# Patient Record
Sex: MALE | Race: OTHER | URBAN - METROPOLITAN AREA
[De-identification: names, ages, dates, MRNs, and addresses within clinical notes are randomized per-mention and may not be internally consistent; named-entity substitution may affect disease eponyms.]

---

## 2023-02-16 ENCOUNTER — EMERGENCY (EMERGENCY)
Facility: HOSPITAL | Age: 38
LOS: 0 days | Discharge: HOME | End: 2023-02-16
Attending: STUDENT IN AN ORGANIZED HEALTH CARE EDUCATION/TRAINING PROGRAM
Payer: MEDICAID

## 2023-02-16 VITALS
WEIGHT: 160.06 LBS | HEIGHT: 68 IN | RESPIRATION RATE: 18 BRPM | DIASTOLIC BLOOD PRESSURE: 84 MMHG | TEMPERATURE: 98 F | HEART RATE: 85 BPM | OXYGEN SATURATION: 99 % | SYSTOLIC BLOOD PRESSURE: 126 MMHG

## 2023-02-16 VITALS
RESPIRATION RATE: 20 BRPM | HEART RATE: 64 BPM | OXYGEN SATURATION: 99 % | TEMPERATURE: 99 F | SYSTOLIC BLOOD PRESSURE: 111 MMHG | DIASTOLIC BLOOD PRESSURE: 62 MMHG

## 2023-02-16 DIAGNOSIS — R55 SYNCOPE AND COLLAPSE: ICD-10-CM

## 2023-02-16 DIAGNOSIS — R07.89 OTHER CHEST PAIN: ICD-10-CM

## 2023-02-16 DIAGNOSIS — F17.200 NICOTINE DEPENDENCE, UNSPECIFIED, UNCOMPLICATED: ICD-10-CM

## 2023-02-16 DIAGNOSIS — R11.2 NAUSEA WITH VOMITING, UNSPECIFIED: ICD-10-CM

## 2023-02-16 DIAGNOSIS — F12.90 CANNABIS USE, UNSPECIFIED, UNCOMPLICATED: ICD-10-CM

## 2023-02-16 DIAGNOSIS — R10.9 UNSPECIFIED ABDOMINAL PAIN: ICD-10-CM

## 2023-02-16 LAB
ALBUMIN SERPL ELPH-MCNC: 4.4 G/DL — SIGNIFICANT CHANGE UP (ref 3.3–5)
ALP SERPL-CCNC: 73 U/L — SIGNIFICANT CHANGE UP (ref 40–120)
ALT FLD-CCNC: 16 U/L — SIGNIFICANT CHANGE UP (ref 12–78)
ANION GAP SERPL CALC-SCNC: 13 MMOL/L — SIGNIFICANT CHANGE UP (ref 5–17)
AST SERPL-CCNC: 27 U/L — SIGNIFICANT CHANGE UP (ref 15–37)
BASOPHILS # BLD AUTO: 0.02 K/UL — SIGNIFICANT CHANGE UP (ref 0–0.2)
BASOPHILS NFR BLD AUTO: 0.2 % — SIGNIFICANT CHANGE UP (ref 0–2)
BILIRUB SERPL-MCNC: 1.7 MG/DL — HIGH (ref 0.2–1.2)
BUN SERPL-MCNC: 13 MG/DL — SIGNIFICANT CHANGE UP (ref 7–23)
CALCIUM SERPL-MCNC: 8.4 MG/DL — LOW (ref 8.5–10.1)
CHLORIDE SERPL-SCNC: 102 MMOL/L — SIGNIFICANT CHANGE UP (ref 96–108)
CO2 SERPL-SCNC: 24 MMOL/L — SIGNIFICANT CHANGE UP (ref 22–31)
CREAT SERPL-MCNC: 1.17 MG/DL — SIGNIFICANT CHANGE UP (ref 0.5–1.3)
EGFR: 83 ML/MIN/1.73M2 — SIGNIFICANT CHANGE UP
EOSINOPHIL # BLD AUTO: 0 K/UL — SIGNIFICANT CHANGE UP (ref 0–0.5)
EOSINOPHIL NFR BLD AUTO: 0 % — SIGNIFICANT CHANGE UP (ref 0–6)
GLUCOSE SERPL-MCNC: 110 MG/DL — HIGH (ref 70–99)
HCT VFR BLD CALC: 45.8 % — SIGNIFICANT CHANGE UP (ref 39–50)
HGB BLD-MCNC: 15.6 G/DL — SIGNIFICANT CHANGE UP (ref 13–17)
IMM GRANULOCYTES NFR BLD AUTO: 0.4 % — SIGNIFICANT CHANGE UP (ref 0–0.9)
LIDOCAIN IGE QN: 193 U/L — SIGNIFICANT CHANGE UP (ref 73–393)
LYMPHOCYTES # BLD AUTO: 19 % — SIGNIFICANT CHANGE UP (ref 13–44)
LYMPHOCYTES # BLD AUTO: 2.12 K/UL — SIGNIFICANT CHANGE UP (ref 1–3.3)
MCHC RBC-ENTMCNC: 28.8 PG — SIGNIFICANT CHANGE UP (ref 27–34)
MCHC RBC-ENTMCNC: 34.1 G/DL — SIGNIFICANT CHANGE UP (ref 32–36)
MCV RBC AUTO: 84.5 FL — SIGNIFICANT CHANGE UP (ref 80–100)
MONOCYTES # BLD AUTO: 0.91 K/UL — HIGH (ref 0–0.9)
MONOCYTES NFR BLD AUTO: 8.2 % — SIGNIFICANT CHANGE UP (ref 2–14)
NEUTROPHILS # BLD AUTO: 8.06 K/UL — HIGH (ref 1.8–7.4)
NEUTROPHILS NFR BLD AUTO: 72.2 % — SIGNIFICANT CHANGE UP (ref 43–77)
NRBC # BLD: 0 /100 WBCS — SIGNIFICANT CHANGE UP (ref 0–0)
PLATELET # BLD AUTO: 245 K/UL — SIGNIFICANT CHANGE UP (ref 150–400)
POTASSIUM SERPL-MCNC: 2.9 MMOL/L — CRITICAL LOW (ref 3.5–5.3)
POTASSIUM SERPL-SCNC: 2.9 MMOL/L — CRITICAL LOW (ref 3.5–5.3)
PROT SERPL-MCNC: 8.1 GM/DL — SIGNIFICANT CHANGE UP (ref 6–8.3)
RBC # BLD: 5.42 M/UL — SIGNIFICANT CHANGE UP (ref 4.2–5.8)
RBC # FLD: 12.9 % — SIGNIFICANT CHANGE UP (ref 10.3–14.5)
SODIUM SERPL-SCNC: 139 MMOL/L — SIGNIFICANT CHANGE UP (ref 135–145)
WBC # BLD: 10.95 K/UL — HIGH (ref 3.8–10.5)
WBC # FLD AUTO: 10.95 K/UL — HIGH (ref 3.8–10.5)

## 2023-02-16 PROCEDURE — 93010 ELECTROCARDIOGRAM REPORT: CPT

## 2023-02-16 PROCEDURE — 99285 EMERGENCY DEPT VISIT HI MDM: CPT

## 2023-02-16 PROCEDURE — 71045 X-RAY EXAM CHEST 1 VIEW: CPT | Mod: 26

## 2023-02-16 PROCEDURE — 76705 ECHO EXAM OF ABDOMEN: CPT | Mod: 26

## 2023-02-16 RX ORDER — POTASSIUM CHLORIDE 20 MEQ
10 PACKET (EA) ORAL
Refills: 0 | Status: COMPLETED | OUTPATIENT
Start: 2023-02-16 | End: 2023-02-16

## 2023-02-16 RX ORDER — ONDANSETRON 8 MG/1
1 TABLET, FILM COATED ORAL
Qty: 21 | Refills: 0
Start: 2023-02-16 | End: 2023-02-22

## 2023-02-16 RX ORDER — ONDANSETRON 8 MG/1
4 TABLET, FILM COATED ORAL ONCE
Refills: 0 | Status: COMPLETED | OUTPATIENT
Start: 2023-02-16 | End: 2023-02-16

## 2023-02-16 RX ORDER — DIPHENHYDRAMINE HCL 50 MG
25 CAPSULE ORAL ONCE
Refills: 0 | Status: COMPLETED | OUTPATIENT
Start: 2023-02-16 | End: 2023-02-16

## 2023-02-16 RX ORDER — FAMOTIDINE 10 MG/ML
20 INJECTION INTRAVENOUS ONCE
Refills: 0 | Status: COMPLETED | OUTPATIENT
Start: 2023-02-16 | End: 2023-02-16

## 2023-02-16 RX ORDER — SODIUM CHLORIDE 9 MG/ML
2300 INJECTION INTRAMUSCULAR; INTRAVENOUS; SUBCUTANEOUS ONCE
Refills: 0 | Status: COMPLETED | OUTPATIENT
Start: 2023-02-16 | End: 2023-02-16

## 2023-02-16 RX ORDER — METOCLOPRAMIDE HCL 10 MG
10 TABLET ORAL ONCE
Refills: 0 | Status: COMPLETED | OUTPATIENT
Start: 2023-02-16 | End: 2023-02-16

## 2023-02-16 RX ADMIN — FAMOTIDINE 20 MILLIGRAM(S): 10 INJECTION INTRAVENOUS at 16:36

## 2023-02-16 RX ADMIN — Medication 100 MILLIEQUIVALENT(S): at 16:35

## 2023-02-16 RX ADMIN — Medication 100 MILLIEQUIVALENT(S): at 20:29

## 2023-02-16 RX ADMIN — SODIUM CHLORIDE 2300 MILLILITER(S): 9 INJECTION INTRAMUSCULAR; INTRAVENOUS; SUBCUTANEOUS at 15:51

## 2023-02-16 RX ADMIN — Medication 100 MILLIEQUIVALENT(S): at 18:01

## 2023-02-16 RX ADMIN — ONDANSETRON 4 MILLIGRAM(S): 8 TABLET, FILM COATED ORAL at 15:53

## 2023-02-16 RX ADMIN — Medication 10 MILLIGRAM(S): at 16:35

## 2023-02-16 RX ADMIN — Medication 25 MILLIGRAM(S): at 18:01

## 2023-02-16 RX ADMIN — Medication 30 MILLILITER(S): at 18:12

## 2023-02-16 NOTE — ED PROVIDER NOTE - PHYSICAL EXAMINATION
GEN: Awake, alert, interactive, appears uncomfortable / with active emesis in ED.   HEAD AND NECK: NC/AT. Airway patent. Neck supple.   EYES:  Clear b/l. EOMI. PERRL.   ENT: Moist mucus membranes.   CARDIAC: Regular rate, regular rhythm. No evident pedal edema.    RESP/CHEST: Normal respiratory effort with no use of accessory muscles or retractions. Clear throughout on auscultation.  ABD: Soft, non-distended, non-tender. No rebound, no guarding.   BACK: No midline spinal TTP. No CVAT.   EXTREMITIES: Moving all extremities with no apparent deformities.   SKIN: Warm, dry, intact normal color. No rash.   NEURO: AOx3, CN II-XII grossly intact, no focal deficits.   PSYCH: Appropriate mood and affect.

## 2023-02-16 NOTE — ED ADULT NURSE NOTE - NSIMPLEMENTINTERV_GEN_ALL_ED
Implemented All Universal Safety Interventions:  [place] to call system. Call bell, personal items and telephone within reach. Instruct patient to call for assistance. Room bathroom lighting operational. Non-slip footwear when patient is off stretcher. Physically safe environment: no spills, clutter or unnecessary equipment. Stretcher in lowest position, wheels locked, appropriate side rails in place.

## 2023-02-16 NOTE — ED ADULT NURSE REASSESSMENT NOTE - NS ED NURSE REASSESS COMMENT FT1
patient walking around ER talking loud and being verbally abusive towards er staff , because " I don't know why I'm not inside and I wait here " nurse explained the patient that as soon as there will be a bed available he will go in

## 2023-02-16 NOTE — ED PROVIDER NOTE - CLINICAL SUMMARY MEDICAL DECISION MAKING FREE TEXT BOX
Otherwise healthy 37M + daily marijuana use pw 1wk N/V, diffuse abd pain. AF, VSS. Pt appears uncomfortable, w/ active emesis. Plan for meds, labs +/- CT imaging. Re-eval. Otherwise healthy 37M + daily marijuana use pw 1wk N/V, diffuse abd pain. AF, VSS. Pt appears uncomfortable, w/ active emesis. Plan for meds, labs +/- CT imaging. Re-eval.  W/u w/o significant abnormalities other than K+2.9, given IV replacement. On re-eval, pt w/ improvement in symptoms w/ ED meds. Pt care signed out at change of shift, pending US.

## 2023-02-16 NOTE — ED ADULT NURSE NOTE - OBJECTIVE STATEMENT
BIBA as per patient c/o vomiting and abdominal pain x 1 week. Seen and discharged from [facility] on 2/14. Pt reporting chest pain, "  Hx: HTN

## 2023-02-16 NOTE — ED PROVIDER NOTE - OBJECTIVE STATEMENT
37M pw 1wk of diffuse abd pain a/w N/V. Pt reports seen at OSH Friday, d/c home. Pt w/ near-syncope Monday, return to ED, d/c home. Pt w/ continued symptoms, near-syncope today. + chest burning / tightness. Denies F/C, h/a, dizziness, SOB, cough, flank pain, diarrhea / constipation, UTI sx, LE pain / swelling.     PMH none, PSH none, NKDA, no meds. + daily marijuana use. + smoker.

## 2023-02-16 NOTE — ED PROVIDER NOTE - CARE PROVIDER_API CALL
Juan Luis Don)  Medicine  [address], [address]  [place] [address]  Phone: [phone]  Fax: [phone]  Follow Up Time: 7-10 Days

## 2023-02-16 NOTE — ED PROVIDER NOTE - PATIENT PORTAL LINK FT
You can access the FollowMyHealth Patient Portal offered by [facility] by registering at the following website: [email or web address]. By joining [employer]’s FollowMyHealth portal, you will also be able to view your health information using other applications (apps) compatible with our system.

## 2023-02-16 NOTE — ED PROVIDER NOTE - NSFOLLOWUPINSTRUCTIONS_ED_ALL_ED_FT
What is cannabinoid hyperemesis syndrome?    Cannabinoid hyperemesis syndrome (CHS) is a condition that leads to repeated and severe bouts of vomiting. It is rare and only occurs in daily long-term users of marijuana (cannabis). Usually marijuana and other cannabinoids decrease nausea and vomiting, but in some high frequency long-term users, marijuana has the opposite effect.    Marijuana is the dried leaves, flowers, stems, and seeds from the Cannabis sativa plant. Hashish is made from the concentrated resins of the Cannabis satvia's female flower. Marijuana has several active substances. These include tetrahydrocannabinol (THC) and related chemicals. These bind to molecules found in the brain. That causes the drug “high” and other effects that users feel.    Your digestive tract also has a number of molecules that bind to THC and related substances. So marijuana also affects the digestive tract. For example, the drug can alter the time it takes the stomach to empty. It also affects the esophageal sphincter. That’s the tight band of muscle that opens and closes to let food from the esophagus into the stomach. Long-term marijuana use can change the way the affected molecules respond and lead to the symptoms of CHS.    Marijuana is the most widely used illegal drug in the [place] Young adults are the most frequent users. A small portion of these people develop CHS. It usually only happens in people who have regularly used marijuana for several years. Often CHS affects those who use the drug at least once a day.    What causes cannabinoid hyperemesis syndrome?    Marijuana has very complex effects on the body. Experts are still trying to learn exactly how it causes CHS in some people.    In the brain, marijuana often has the opposite effect of CHS. It helps prevent nausea and vomiting. The drug is also good at stopping such symptoms in people having chemotherapy.    In the digestive tract, marijuana seems to have the opposite effect. It actually makes you more likely to have nausea and vomiting. With first use, the signals from the brain may be more important. That may lead to anti-nausea effects at first. But with repeated use of marijuana, certain receptors in the brain may stop responding to the drug in the same way. That may cause the repeated bouts of vomiting found in people with CHS.    It still isn’t clear why some heavy marijuana users get the syndrome. But others do not.    What are the symptoms of cannabinoid hyperemesis syndrome?    People with CHS suffer from repeated bouts of vomiting. In between these episodes are times without any symptoms. Healthcare providers usually divide these symptoms into 3 stages. They are the prodromal phase, the hyperemetic phase, and the recovery phase.    During the prodromal phase, the main symptoms are usually early morning nausea and belly (abdominal) pain. Some people also develop a fear of vomiting. Most people keep normal eating patterns during this time. Some people use more marijuana because they think it will help stop the nausea. This phase may last for months to years.    The hyperemetic phase is next. Symptoms during this time may include:  Ongoing nausea  Repeated episodes of vomiting  Abdominal pain  Decreased food intake and weight loss  Symptoms of dehydration    During this phase, vomiting is often intense and overwhelming. Many people take a lot of hot showers during the day. They find that doing so eases their nausea. (That may be because of the effects of the hot temperature on a part of the brain called the hypothalamus. It has effects on both temperature regulation and vomiting.) People often first seek medical care during this phase.    The hyperemetic phase may continue until the person completely stops using marijuana. Then the recovery phrase starts. During this time, symptoms go away. Normal eating is possible again. This phase can last days to months. Symptoms usually come back if the person tries marijuana again.    How is cannabinoid hyperemesis syndrome diagnosed?    Many health problems can cause repeated vomiting. To make a diagnosis, your healthcare provider will ask you about your symptoms and your past health. He or she will also do a physical exam, including an exam of your abdomen.    Your healthcare provider may also need more tests to rule out other causes of the vomiting. That’s especially the case for ones that may signal a health emergency. Based on your other symptoms, these tests might include:  Blood tests for anemia and infection  Tests for electrolytes  Tests for pancreas and liver enzymes, to check these organs  Pregnancy test  Urine analysis, to test for infection or other urinary causes  Drug screen, to test for drug-related causes of vomiting  X-rays of the abdomen, to check for conditions like blockage  Upper endoscopy, to view the stomach and esophagus for possible causes of vomiting  Head CT scan, if a nervous system cause of vomiting seems likely  Abdominal CT scan, to check for health problems that might need surgery    CHS was only recently discovered. So some healthcare providers may not know about it. As a result, they may fail to spot it for many years. They often confuse CHS with cyclical vomiting disorder. It’s a health problem that causes similar symptoms. A gastroenterologist might make the diagnosis.    How is cannabinoid hyperemesis syndrome treated?    If you have had severe vomiting, you might need to stay in the hospital for a short time. During the hyperemesis phase, you might need these treatments:  Fluid replacement for dehydration, given through an IV  Medicines to help decrease vomiting  Pain medicine  Proton-pump inhibitors, to treat stomach inflammation  Frequent hot showers    Symptoms often ease after a day or two unless marijuana is used before this time.    To fully recover, you need to stop using marijuana all together. Some people may get help from drug rehabilitation programs to help them quit. Cognitive behavioral therapy or family therapy can also help. If you stop using marijuana, your symptoms should not come back.    What are the complications of cannabinoid hyperemesis syndrome?    Very severe, prolonged vomiting may lead to dehydration. It may also lead to electrolyte problems in your blood. If untreated, these can cause rare complications such as:  Brain swelling (cerebral edema)  Muscle spasms or weakness  Seizures  Kidney failure  Heart rhythm abnormalities  Shock    Your healthcare team will quickly work to fix any dehydration or electrolyte problems. Doing so can help prevent these problems.    What can I do to prevent cannabinoid hyperemesis syndrome?    You can prevent CHS by not using marijuana in any form. You may be reluctant to believe that marijuana might be the underlying cause of your symptoms. That may be because you have used it for many years without having any problems. The syndrome may take several years to develop. The drug may help prevent nausea in new users who don’t use it often. But people with CHS need to completely quit using it. If they don’t, their symptoms will likely come back.    Quitting marijuana may lead to other health benefits, like:  Better lung function  Improved memory and thinking skills  Better sleep  Decreased risk for depression and anxiety    When should I call my healthcare provider?  Call your healthcare provider if you have had severe vomiting for a day or more.  Key points about cannabinoid hyperemesis syndrome  CHS is a condition that leads to repeated and severe bouts of vomiting. It results from long-term use of marijuana.  Most people self-treat using hot showers to help reduce their symptoms.  Some people with the syndrome may not be diagnosed for several years. Admitting to your healthcare provider that you use marijuana daily can significantly speed up the diagnosis and possibly avoid many invasive and expensive tests.  You might need to stay in the hospital to treat dehydration from CHS.  Symptoms start to go away within a day or two after stopping marijuana use. In people with CHS, symptoms almost always come back if they use the drug again.    Next steps    Tips to help you get the most from a visit to your healthcare provider:  Know the reason for your visit and what you want to happen.  Before your visit, write down questions you want answered.  Bring someone with you to help you ask questions and remember what your provider tells you.  At the visit, write down the name of a new diagnosis, and any new medicines, treatments, or tests. Also write down any new instructions your provider gives you.  Know why a new medicine or treatment is prescribed, and how it will help you. Also know what the side effects are.  Ask if your condition can be treated in other ways.  Know why a test or procedure is recommended and what the results could mean.  Know what to expect if you do not take the medicine or have the test or procedure.  If you have a follow-up appointment, write down the date, time, and purpose for that visit.  Know how you can contact your provider if you have questions.

## 2023-02-16 NOTE — ED ADULT TRIAGE NOTE - CHIEF COMPLAINT QUOTE
BIBA as per patient c/o vomiting and abdominal pain x 1 week. Seen and discharged from [facility] on 2/14. Pt reporting chest pain, states starts "everytime I go to the hospital"  Hx: HTN

## 2023-02-16 NOTE — ED PROVIDER NOTE - CARE PROVIDERS DIRECT ADDRESSES
,[email or web address]

## 2023-07-11 ENCOUNTER — EMERGENCY (EMERGENCY)
Facility: HOSPITAL | Age: 38
LOS: 1 days | Discharge: HOME | End: 2023-07-11
Attending: EMERGENCY MEDICINE
Payer: MEDICAID

## 2023-07-11 VITALS
OXYGEN SATURATION: 97 % | WEIGHT: 186.95 LBS | HEART RATE: 100 BPM | TEMPERATURE: 98 F | SYSTOLIC BLOOD PRESSURE: 131 MMHG | RESPIRATION RATE: 17 BRPM | DIASTOLIC BLOOD PRESSURE: 84 MMHG | HEIGHT: 68 IN

## 2023-07-11 VITALS
DIASTOLIC BLOOD PRESSURE: 94 MMHG | RESPIRATION RATE: 17 BRPM | SYSTOLIC BLOOD PRESSURE: 139 MMHG | HEART RATE: 66 BPM | OXYGEN SATURATION: 99 % | TEMPERATURE: 98 F

## 2023-07-11 LAB
ALBUMIN SERPL ELPH-MCNC: 4.1 G/DL — SIGNIFICANT CHANGE UP (ref 3.5–5)
ALP SERPL-CCNC: 79 U/L — SIGNIFICANT CHANGE UP (ref 40–120)
ALT FLD-CCNC: 29 U/L DA — SIGNIFICANT CHANGE UP (ref 10–60)
ANION GAP SERPL CALC-SCNC: 9 MMOL/L — SIGNIFICANT CHANGE UP (ref 5–17)
AST SERPL-CCNC: 41 U/L — HIGH (ref 10–40)
BASOPHILS # BLD AUTO: 0.03 K/UL — SIGNIFICANT CHANGE UP (ref 0–0.2)
BASOPHILS NFR BLD AUTO: 0.4 % — SIGNIFICANT CHANGE UP (ref 0–2)
BILIRUB SERPL-MCNC: 1.4 MG/DL — HIGH (ref 0.2–1.2)
BUN SERPL-MCNC: 12 MG/DL — SIGNIFICANT CHANGE UP (ref 7–18)
CALCIUM SERPL-MCNC: 8.1 MG/DL — LOW (ref 8.4–10.5)
CHLORIDE SERPL-SCNC: 109 MMOL/L — HIGH (ref 96–108)
CO2 SERPL-SCNC: 23 MMOL/L — SIGNIFICANT CHANGE UP (ref 22–31)
CREAT SERPL-MCNC: 1 MG/DL — SIGNIFICANT CHANGE UP (ref 0.5–1.3)
EGFR: 101 ML/MIN/1.73M2 — SIGNIFICANT CHANGE UP
EOSINOPHIL # BLD AUTO: 0.02 K/UL — SIGNIFICANT CHANGE UP (ref 0–0.5)
EOSINOPHIL NFR BLD AUTO: 0.3 % — SIGNIFICANT CHANGE UP (ref 0–6)
GLUCOSE SERPL-MCNC: 122 MG/DL — HIGH (ref 70–99)
HCT VFR BLD CALC: 44.7 % — SIGNIFICANT CHANGE UP (ref 39–50)
HGB BLD-MCNC: 15.2 G/DL — SIGNIFICANT CHANGE UP (ref 13–17)
HIV 1 & 2 AB SERPL IA.RAPID: SIGNIFICANT CHANGE UP
IMM GRANULOCYTES NFR BLD AUTO: 0.1 % — SIGNIFICANT CHANGE UP (ref 0–0.9)
LACTATE SERPL-SCNC: 2 MMOL/L — SIGNIFICANT CHANGE UP (ref 0.7–2)
LIDOCAIN IGE QN: 141 U/L — SIGNIFICANT CHANGE UP (ref 73–393)
LYMPHOCYTES # BLD AUTO: 2.11 K/UL — SIGNIFICANT CHANGE UP (ref 1–3.3)
LYMPHOCYTES # BLD AUTO: 30.1 % — SIGNIFICANT CHANGE UP (ref 13–44)
MCHC RBC-ENTMCNC: 28.6 PG — SIGNIFICANT CHANGE UP (ref 27–34)
MCHC RBC-ENTMCNC: 34 GM/DL — SIGNIFICANT CHANGE UP (ref 32–36)
MCV RBC AUTO: 84 FL — SIGNIFICANT CHANGE UP (ref 80–100)
MONOCYTES # BLD AUTO: 0.74 K/UL — SIGNIFICANT CHANGE UP (ref 0–0.9)
MONOCYTES NFR BLD AUTO: 10.6 % — SIGNIFICANT CHANGE UP (ref 2–14)
NEUTROPHILS # BLD AUTO: 4.1 K/UL — SIGNIFICANT CHANGE UP (ref 1.8–7.4)
NEUTROPHILS NFR BLD AUTO: 58.5 % — SIGNIFICANT CHANGE UP (ref 43–77)
NRBC # BLD: 0 /100 WBCS — SIGNIFICANT CHANGE UP (ref 0–0)
PLATELET # BLD AUTO: 232 K/UL — SIGNIFICANT CHANGE UP (ref 150–400)
POTASSIUM SERPL-MCNC: 3.2 MMOL/L — LOW (ref 3.5–5.3)
POTASSIUM SERPL-SCNC: 3.2 MMOL/L — LOW (ref 3.5–5.3)
PROT SERPL-MCNC: 7.6 G/DL — SIGNIFICANT CHANGE UP (ref 6–8.3)
RBC # BLD: 5.32 M/UL — SIGNIFICANT CHANGE UP (ref 4.2–5.8)
RBC # FLD: 12.3 % — SIGNIFICANT CHANGE UP (ref 10.3–14.5)
SODIUM SERPL-SCNC: 141 MMOL/L — SIGNIFICANT CHANGE UP (ref 135–145)
WBC # BLD: 7.01 K/UL — SIGNIFICANT CHANGE UP (ref 3.8–10.5)
WBC # FLD AUTO: 7.01 K/UL — SIGNIFICANT CHANGE UP (ref 3.8–10.5)

## 2023-07-11 PROCEDURE — 85025 COMPLETE CBC W/AUTO DIFF WBC: CPT

## 2023-07-11 PROCEDURE — 80053 COMPREHEN METABOLIC PANEL: CPT

## 2023-07-11 PROCEDURE — 99284 EMERGENCY DEPT VISIT MOD MDM: CPT | Mod: 25

## 2023-07-11 PROCEDURE — 96374 THER/PROPH/DIAG INJ IV PUSH: CPT

## 2023-07-11 PROCEDURE — 83605 ASSAY OF LACTIC ACID: CPT

## 2023-07-11 PROCEDURE — 86703 HIV-1/HIV-2 1 RESULT ANTBDY: CPT

## 2023-07-11 PROCEDURE — 96375 TX/PRO/DX INJ NEW DRUG ADDON: CPT

## 2023-07-11 PROCEDURE — 99284 EMERGENCY DEPT VISIT MOD MDM: CPT

## 2023-07-11 PROCEDURE — 36415 COLL VENOUS BLD VENIPUNCTURE: CPT

## 2023-07-11 PROCEDURE — 83690 ASSAY OF LIPASE: CPT

## 2023-07-11 RX ORDER — METOCLOPRAMIDE HCL 10 MG
10 TABLET ORAL ONCE
Refills: 0 | Status: COMPLETED | OUTPATIENT
Start: 2023-07-11 | End: 2023-07-11

## 2023-07-11 RX ORDER — SODIUM CHLORIDE 9 MG/ML
2000 INJECTION INTRAMUSCULAR; INTRAVENOUS; SUBCUTANEOUS ONCE
Refills: 0 | Status: COMPLETED | OUTPATIENT
Start: 2023-07-11 | End: 2023-07-11

## 2023-07-11 RX ORDER — FAMOTIDINE 10 MG/ML
20 INJECTION INTRAVENOUS ONCE
Refills: 0 | Status: COMPLETED | OUTPATIENT
Start: 2023-07-11 | End: 2023-07-11

## 2023-07-11 RX ADMIN — Medication 104 MILLIGRAM(S): at 12:45

## 2023-07-11 RX ADMIN — FAMOTIDINE 20 MILLIGRAM(S): 10 INJECTION INTRAVENOUS at 11:46

## 2023-07-11 RX ADMIN — SODIUM CHLORIDE 2000 MILLILITER(S): 9 INJECTION INTRAMUSCULAR; INTRAVENOUS; SUBCUTANEOUS at 11:46

## 2023-07-11 NOTE — ED PROVIDER NOTE - PATIENT PORTAL LINK FT
You can access the FollowMyHealth Patient Portal offered by [facility] by registering at the following website: [email or web address]. By joining [email or web address]’s FollowMyHealth portal, you will also be able to view your health information using other applications (apps) compatible with our system.

## 2023-07-11 NOTE — ED ADULT NURSE NOTE - NSFALLUNIVINTERV_ED_ALL_ED
Bed/Stretcher in lowest position, wheels locked, appropriate side rails in place/Call bell, personal items and telephone in reach/Instruct patient to call for assistance before getting out of bed/chair/stretcher/Non-slip footwear applied when patient is off stretcher/[place] to call system/Physically safe environment - no spills, clutter or unnecessary equipment/Purposeful proactive rounding/Room/bathroom lighting operational, light cord in reach

## 2023-07-11 NOTE — ED PROVIDER NOTE - NSFOLLOWUPINSTRUCTIONS_ED_ALL_ED_FT
Please follow up with your primary care doctor in 1-2 days.  Please return to the emergency department if you have worsening pain, chest pain, shortness of breath, vomiting, fever, black or bloody stool or urine, trouble urinating, or any other symptoms.      Abdominal Pain    WHAT YOU NEED TO KNOW:    Abdominal pain can be dull, achy, or sharp. You may have pain in one area of your abdomen, or in your entire abdomen. Your pain may be caused by a condition such as constipation, food sensitivity or poisoning, infection, or a blockage. Abdominal pain can also be from a hernia, appendicitis, or an ulcer. Liver, gallbladder, or kidney conditions can also cause abdominal pain. The cause of your abdominal pain may be unknown.     DISCHARGE INSTRUCTIONS:    Return to the emergency department if:   •You have new chest pain or shortness of breath.  •You have pulsing pain in your upper abdomen or lower back that suddenly becomes constant.  •Your pain is in the right lower abdominal area and worsens with movement.   •You have a fever over 100.4°F (38°C) or shaking chills.   •You are vomiting and cannot keep food or liquids down.   •Your pain does not improve or gets worse over the next 8 to 12 hours.   •You see blood in your vomit or bowel movements, or they look black and tarry.   •Your skin or the whites of your eyes turn yellow.   •You are a woman and have a large amount of vaginal bleeding that is not your monthly period.     Contact your healthcare provider if:   •You have pain in your lower back.   •You are a man and have pain in your testicles.  •You have pain when you urinate.   •You have questions or concerns about your condition or care.    Follow up with your healthcare provider within 24 hours or as directed: Write down your questions so you remember to ask them during your visits.     Medicines:   •Medicines may be given to calm your stomach and prevent vomiting or to decrease pain. Ask how to take pain medicine safely.  •Take your medicine as directed. Contact your healthcare provider if you think your medicine is not helping or if you have side effects. Tell him of her if you are allergic to any medicine. Keep a list of the medicines, vitamins, and herbs you take. Include the amounts, and when and why you take them. Bring the list or the pill bottles to follow-up visits. Carry your medicine list with you in case of an emergency.

## 2023-07-11 NOTE — ED PROVIDER NOTE - CLINICAL SUMMARY MEDICAL DECISION MAKING FREE TEXT BOX
No evidence of torsion. Character low suspicion for mesenteric ischemia to warrant CT angio imaging. No urinary signs/symptoms. Given IV fluids and Reglan with    Character could be consistent with that of cannabis hyperemesis that he describes. No evidence of torsion. Character low suspicion for mesenteric ischemia to warrant CT angio imaging. No urinary signs/symptoms. Given IV fluids and Reglan with significant improvement and actively requesting discharge. Resolved abd pain and tenderness on rpt exam. Is AAO and displays appropriate decision making capacity and declines CT abdomen. Character could be consistent with that of cannabis hyperemesis that he describes. Patient is well appearing, NAD, afebrile, hemodynamically stable. Any available tests and studies were discussed with patient. Discharged with instructions in further symptomatic care, return precautions, and need for PMD f/u.

## 2023-07-11 NOTE — ED PROVIDER NOTE - NSFOLLOWUPCLINICS_GEN_ALL_ED_FT
[facility]  [address] [place] [address]  Phone: [phone]  Fax: [phone]  Follow Up Time: 1-3 Days

## 2023-07-11 NOTE — ED PROVIDER NOTE - PHYSICAL EXAMINATION
Afebrile, hemodynamically stable, saturating well on room air  Uncomfortable appearing, loud retching, no WOB/tachypnea, speaking full sentences  Head NCAT  EOMI grossly, anicteric  MMM  No JVD  RRR, nml S1/S2, no m/r/g  Lungs CTAB, no w/r/r  Abd soft, diffuse TTP with light touch, ND, no rebound or guarding  AAO, CN's 3-12 grossly intact  YUN spontaneously, no leg cyanosis or edema  Skin warm, well perfused, no rashes or hives

## 2023-07-11 NOTE — ED PROVIDER NOTE - OBJECTIVE STATEMENT
37-year-old male with history of daily THC use, no other past medical history, no past surgical history, presents with profuse nausea and NBNB emesis. Patient states that this is secondary to his recent and he presents identically approximately every 5 months for the same. Denies actual abdominal pain (despite triage report of this), chest pain, shortness of breath, fever, diarrhea, constipation, urinary changes, testicular pain or swelling, and all other symptoms. 37-year-old male with history of daily THC use, no other past medical history, no past surgical history, presents with profuse nausea and NBNB emesis. Patient states that this is secondary to his recent and he presents identically approximately every 5 months for the same. Improves with hot showers. Denies actual abdominal pain (despite triage report of this), chest pain, shortness of breath, fever, diarrhea, constipation, urinary changes, testicular pain or swelling, and all other symptoms.

## 2023-07-11 NOTE — ED ADULT NURSE NOTE - OBJECTIVE STATEMENT
Patient BIBA A&OX4 c/o abdominal pain 8/10 and n/v/ Denies diarrhea. Sudden onset this morning. No SOB noted. No acute distress noted. Patient has two male children at bedside.